# Patient Record
Sex: MALE | Race: WHITE | NOT HISPANIC OR LATINO | Employment: FULL TIME | URBAN - METROPOLITAN AREA
[De-identification: names, ages, dates, MRNs, and addresses within clinical notes are randomized per-mention and may not be internally consistent; named-entity substitution may affect disease eponyms.]

---

## 2024-03-15 ENCOUNTER — PATIENT ROUNDING (BHMG ONLY) (OUTPATIENT)
Dept: ORTHOPEDIC SURGERY | Facility: CLINIC | Age: 34
End: 2024-03-15
Payer: COMMERCIAL

## 2024-03-15 ENCOUNTER — OFFICE VISIT (OUTPATIENT)
Dept: ORTHOPEDIC SURGERY | Facility: CLINIC | Age: 34
End: 2024-03-15
Payer: COMMERCIAL

## 2024-03-15 VITALS — HEIGHT: 76 IN | BODY MASS INDEX: 25.57 KG/M2 | HEART RATE: 71 BPM | OXYGEN SATURATION: 98 % | WEIGHT: 210 LBS

## 2024-03-15 DIAGNOSIS — S62.647A CLOSED NONDISPLACED FRACTURE OF PROXIMAL PHALANX OF LEFT LITTLE FINGER, INITIAL ENCOUNTER: Primary | ICD-10-CM

## 2024-03-15 DIAGNOSIS — M79.645 FINGER PAIN, LEFT: ICD-10-CM

## 2024-03-15 NOTE — PROGRESS NOTES
"NEW VISIT    Patient: Mario Ureña  ?  YOB: 1990    MRN: [id number]  ?  Chief Complaint   Patient presents with    Left Hand - Initial Evaluation      ?  HPI: Patient 33-year-old male presents today for acute left pinky finger injury.  States point basketball on 3/3/2024 when he \"jammed\" his pinky finger with ball hitting the tip of the finger.  Afterwards had bruising, swelling and pain at PIP joint.  Subsequently evaluated at urgent care with x-rays obtained and noted small chip fracture at PIP.  Was placed in finger splint, which patient has discontinued this was significant residual stiffness due to splinting.  Continues to have mild pain, swelling over fifth PIP.  Full flexion extension of the digit.  Denies numbness or tingling.  States works at [employer] with frequent lifting gripping and moving equipment.  Current treatment is activity modification.  Denies regular icing, oral medication or splinting    Allergies:   Allergies   Allergen Reactions    Lemborexant Other (See Comments)     Sleep paralysis    Nuts Other (See Comments)     Chest tightening, itching and redness       Past Medical History:   Diagnosis Date    Fracture, finger 3/3/24    Left pinkie    Hyperlipidemia      Past Surgical History:   Procedure Laterality Date    WISDOM TOOTH EXTRACTION       Social History     Occupational History    Not on file   Tobacco Use    Smoking status: Never    Smokeless tobacco: Never   Vaping Use    Vaping status: Never Used   Substance and Sexual Activity    Alcohol use: Yes     Comment: rarely    Drug use: Never    Sexual activity: Yes     Partners: Female     Birth control/protection: None      Social History     Social History Narrative    Not on file     Family History   Problem Relation Age of Onset    Cancer Mother     Diabetes Mother     Cancer Father         Testicular    Diabetes Paternal Grandfather        Review of Systems  Constitutional: Negative.  Negative for fever. " "  Musculoskeletal: Positive for joint pain  Skin: Negative.  Negative for rash and wound.    Neurological: Negative for numbness.     Vitals:    03/15/24 0800   Pulse: 71   SpO2: 98%   Weight: 95.3 kg (210 lb)   Height: 193 cm (76\")        Physical Exam  Constitutional: Patient is oriented to person, place, and time. Appears well-developed and well-nourished.   Head: Normocephalic and atraumatic.   Pulmonary/Chest: Effort normal.   Musculoskeletal:   See detailed exam below   Neurological: Alert and oriented to person, place, and time. No sensory deficit. Coordination normal.   Skin: Skin is warm and dry. Capillary refill takes less than 2 seconds. No rash noted. No erythema.     The left pinky finger is without obvious signs of acute bony deformity, erythema or ecchymosis.  Skin and soft tissue intact.  There is mild swelling at PIP joint, with associated tenderness over radial collateral ligament.  No palmar tenderness.  There is no tenderness at MCP or DIP.  There is no tenderness to palpation or swelling over dorsal and volar aspects of the MCP, DIP or PIP joints.  Flexor and extensor tendons are intact with no extensor lag or flexion deformity.  No laxity to collateral ligaments.  Capillary refill is 2 seconds with a brisk return.  No rotational deformity noted with full flexion of fingers towards radial styloid. Neurovascularly intact.      Diagnostics:  xrays obtained last on 3/7/24    XR Hand 3+ View Left    Result Date: 3/7/2024  Tiny chip fractures of the fifth proximal interphalangeal joint. Electronically Signed: Flip Grady MD  3/7/2024 5:35 PM EST  Workstation ID: [id number]        Assessment:  Diagnoses and all orders for this visit:    1. Closed nondisplaced fracture of proximal phalanx of left little finger, initial encounter (Primary)    2. Finger pain, left      ?    Plan    Above diagnosis and plan discussed with patient in office today.  I did personally review x-ray imaging with patient in " office today remarkable for small chip fracture at fifth PIP joint, likely at attachment of collateral ligament.  Patient neurovascularly intact, with full flexor extensor tendon function.  Currently 2 weeks from initial injury.  Encouraged him to buddy tape at work and with any sporting/workout activity to his fourth digit for the next 2 weeks.  Continue regular RICE, and oral over-the-counter anti-inflammatories as needed.  Patient prefers to follow-up if any new or concerning symptoms.  Discussed can discontinue buddy tape with gentle range of motion to avoid stiffness outside of working hours, and after 2 weeks as symptoms improve  Rest, ice, compression, and elevation (RICE) therapy  Alternate OTC Ibuprofen and Tylenol as needed/if clinically indicated  Follow up as needed if new or worsening symptoms.      Date of encounter: 03/15/2024   Tevin Desai DO    Electronically signed by Tevin Desai DO, 03/15/24, 8:05 AM EDT.    Disclaimer: Please note that areas of this note were completed with computer voice recognition software.  Quite often unanticipated grammatical, syntax, homophones, and other interpretive errors are inadvertently transcribed by the computer software. Please excuse any errors that have escaped final proofreading.

## 2024-03-15 NOTE — PROGRESS NOTES
A My-Chart message has been sent to the patient for PATIENT ROUNDING with [facility] [place]

## 2024-05-03 ENCOUNTER — OFFICE VISIT (OUTPATIENT)
Dept: ORTHOPEDIC SURGERY | Facility: CLINIC | Age: 34
End: 2024-05-03
Payer: COMMERCIAL

## 2024-05-03 VITALS — WEIGHT: 212 LBS | HEIGHT: 76 IN | BODY MASS INDEX: 25.82 KG/M2 | HEART RATE: 84 BPM | OXYGEN SATURATION: 97 %

## 2024-05-03 DIAGNOSIS — S62.647D CLOSED NONDISPLACED FRACTURE OF PROXIMAL PHALANX OF LEFT LITTLE FINGER WITH ROUTINE HEALING, SUBSEQUENT ENCOUNTER: ICD-10-CM

## 2024-05-03 DIAGNOSIS — M79.645 FINGER PAIN, LEFT: Primary | ICD-10-CM

## 2024-05-03 NOTE — PROGRESS NOTES
FOLLOW UP VISIT    Patient: Mario Ureña  ?  YOB: 1990    MRN: [id number]  ?  Chief Complaint   Patient presents with    Left Hand - Follow-up, Pain     Left little finger   Pain 4/10      ?  Pain      Patient returning for follow-up of left pinky finger PIP injury.  Patient was noted chip fracture at PIP on initial visit.  Was treated conservatively with 2 weeks of richelle taping, followed by subsequent richelle taping with activity.  He states he transition from richelle taping to thicker taping reinforcing PIP of just fifth digit for additional 2 weeks, then had discontinued.  Has continued to remain very active utilizing left hand including basketball activities, and work at the [employer] requiring frequent gripping and lifting.  States continues to have mild pain and swelling around PIP joint maximum 4/10.  Has discontinued richelle taping immobilization at this time.  Infrequent ibuprofen.  No ice.  Denies numbness or tingling.  Has full flexion extension and  strength of the left hand with mild discomfort.       Allergies:   Allergies   Allergen Reactions    Lemborexant Other (See Comments)     Sleep paralysis    Nuts Other (See Comments)     Chest tightening, itching and redness       Past Medical History:   Diagnosis Date    Fracture, finger 3/3/24    Left pinkie    Hyperlipidemia      Past Surgical History:   Procedure Laterality Date    WISDOM TOOTH EXTRACTION       Social History     Occupational History    Not on file   Tobacco Use    Smoking status: Never    Smokeless tobacco: Never   Vaping Use    Vaping status: Never Used   Substance and Sexual Activity    Alcohol use: Yes     Comment: rarely    Drug use: Never    Sexual activity: Yes     Partners: Female     Birth control/protection: None      Social History     Social History Narrative    Not on file     Family History   Problem Relation Age of Onset    Cancer Mother     Diabetes Mother     Cancer Father         Testicular    Diabetes  "Paternal Grandfather        Review of Systems  Constitutional: Negative.  Negative for fever.   Musculoskeletal: Positive for joint pain  Skin: Negative.  Negative for rash and wound.    Neurological: Negative for numbness.     Vitals:    05/03/24 0933   Pulse: 84   SpO2: 97%   Weight: 96.2 kg (212 lb)   Height: 193 cm (76\")        Physical Exam  Constitutional: Patient is oriented to person, place, and time. Appears well-developed and well-nourished.   Head: Normocephalic and atraumatic.   Pulmonary/Chest: Effort normal.   Musculoskeletal:   See detailed exam below   Neurological: Alert and oriented to person, place, and time. No sensory deficit. Coordination normal.   Skin: Skin is warm and dry. Capillary refill takes less than 2 seconds. No rash noted. No erythema.     The left pinky finger is without obvious signs of acute bony deformity, erythema or ecchymosis.  Skin and soft tissue intact.  There is again mild swelling at fifth digit PIP joint.  Tenderness over both radial and ulnar collateral ligament at PIP which is reproducible with stress testing although without noted instability.  There is mild swelling at PIP joint. No palmar tenderness.  There is no tenderness at MCP or DIP.  There is no tenderness to palpation or swelling over dorsal and volar aspects of the MCP, DIP or PIP joints.  Flexor and extensor tendons are intact with no extensor lag or flexion deformity.  No laxity to collateral ligaments.  Capillary refill is 2 seconds with a brisk return.  No rotational deformity noted with full flexion of fingers towards radial styloid.  Full 5/5  and interosseous strength and equal compared with contralateral side.  Neurovascularly intact.      Diagnostics:  xrays obtained today    Left fifth finger  Indication: Pain/fracture follow-up  AP, Lateral, and Oblique views    Findings:  Again noted small chip fractures at fifth PIP joint with noted mild interval healing, no significant displacement.  No " acute osseous abnormalities.  Mild soft tissue swelling at PIP.        XR Hand 3+ View Left    Result Date: 3/7/2024  Tiny chip fractures of the fifth proximal interphalangeal joint. Electronically Signed: Flip Grady MD  3/7/2024 5:35 PM EST  Workstation ID: [id number]        Assessment:  Diagnoses and all orders for this visit:    1. Finger pain, left (Primary)  -     Ibuprofen 3 %, Gabapentin 10 %, Baclofen 2 %, lidocaine 4 %; Apply 1-2 g topically to the appropriate area as directed 3 (Three) to 4 (Four) times daily.  Dispense: 90 g; Refill: 1    2. Closed nondisplaced fracture of proximal phalanx of left little finger with routine healing, subsequent encounter  -     XR Finger 2+ View Left      ?    Plan    Patient returning for follow-up left fifth PIP chip fracture and likely collateral ligament injury.  Continues to have tenderness over collateral ligament, as well as likely synovitis of PIP joint with mild swelling.  Strength and flexor/extensor tendon remained intact on exam today.  No current conservative treatment for his symptoms.  Repeat x-rays with no significant displacement of prior identified fracture, mild noted interval healing.  We discussed symptoms are likely continued synovitis and soft tissue swelling secondary to collateral ligament injury and joint irritation.  The patient has remained very active in both sporting and work-related activity utilizing the left hand.  Given persistent symptoms, we will revisit buddy taping for 2 weeks with activity consistently, then additional 2 weeks with sporting activity/work as needed.  We also discussed formal PIP splinting for 2 to 4 weeks and aluminum splint, but patient declining at this time as he had significant stiffness with prior splint immobilization.  Again we will stick with buddy taping as noted above.  We will also initiate topical compound cream for acute pain and swelling, and regular ice 2-3 times daily.  Does follow-up in 4 to 6 weeks if  symptoms have not resolved at that time.  Rest, ice, compression, and elevation (RICE) therapy  Topical compound cream as noted above  Follow up 4-6 weeks if symptoms have not resolved.      Date of encounter: 5/3/2024  Tevin Desai DO    Disclaimer: Please note that areas of this note were completed with computer voice recognition software.  Quite often unanticipated grammatical, syntax, homophones, and other interpretive errors are inadvertently transcribed by the computer software. Please excuse any errors that have escaped final proofreading.